# Patient Record
Sex: FEMALE | Race: WHITE | NOT HISPANIC OR LATINO | Employment: OTHER | URBAN - METROPOLITAN AREA
[De-identification: names, ages, dates, MRNs, and addresses within clinical notes are randomized per-mention and may not be internally consistent; named-entity substitution may affect disease eponyms.]

---

## 2017-02-02 ENCOUNTER — TRANSCRIBE ORDERS (OUTPATIENT)
Dept: ADMINISTRATIVE | Facility: HOSPITAL | Age: 72
End: 2017-02-02

## 2017-02-02 DIAGNOSIS — Z12.31 VISIT FOR SCREENING MAMMOGRAM: Primary | ICD-10-CM

## 2017-05-30 ENCOUNTER — HOSPITAL ENCOUNTER (OUTPATIENT)
Dept: MAMMOGRAPHY | Facility: HOSPITAL | Age: 72
Discharge: HOME | End: 2017-05-30
Payer: COMMERCIAL

## 2017-05-30 ENCOUNTER — HOSPITAL ENCOUNTER (OUTPATIENT)
Dept: MAMMOGRAPHY | Facility: MEDICAL CENTER | Age: 72
Discharge: HOME | End: 2017-05-30
Payer: COMMERCIAL

## 2017-05-30 DIAGNOSIS — Z12.31 VISIT FOR SCREENING MAMMOGRAM: ICD-10-CM

## 2017-05-30 PROCEDURE — 77063 BREAST TOMOSYNTHESIS BI: CPT

## 2017-05-30 PROCEDURE — G0202 SCR MAMMO BI INCL CAD: HCPCS

## 2017-09-15 ENCOUNTER — HOSPITAL ENCOUNTER (OUTPATIENT)
Dept: RADIOLOGY | Age: 72
Discharge: HOME | End: 2017-09-15
Payer: COMMERCIAL

## 2017-09-15 ENCOUNTER — TRANSCRIBE ORDERS (OUTPATIENT)
Dept: ADMINISTRATIVE | Age: 72
End: 2017-09-15

## 2017-09-15 ENCOUNTER — TRANSCRIBE ORDERS (OUTPATIENT)
Dept: ADMINISTRATIVE | Facility: HOSPITAL | Age: 72
End: 2017-09-15

## 2017-09-15 ENCOUNTER — APPOINTMENT (OUTPATIENT)
Dept: RADIOLOGY | Age: 72
End: 2017-09-15
Payer: COMMERCIAL

## 2017-09-15 DIAGNOSIS — C64.1 MALIGNANT NEOPLASM OF RIGHT KIDNEY, EXCEPT RENAL PELVIS (HCC): Primary | ICD-10-CM

## 2017-09-15 DIAGNOSIS — C64.1 MALIGNANT NEOPLASM OF RIGHT KIDNEY, EXCEPT RENAL PELVIS (HCC): ICD-10-CM

## 2017-09-15 PROCEDURE — 76700 US EXAM ABDOM COMPLETE: CPT

## 2017-09-15 PROCEDURE — 71020 HB CHEST X-RAY 2VW FRONTAL&LATL: CPT

## 2017-09-21 ENCOUNTER — ALLSCRIPTS OFFICE VISIT (OUTPATIENT)
Dept: OTHER | Facility: OTHER | Age: 72
End: 2017-09-21

## 2017-09-21 LAB
BILIRUB UR QL STRIP: NORMAL
CLARITY UR: NORMAL
COLOR UR: YELLOW
GLUCOSE (HISTORICAL): NORMAL
HGB UR QL STRIP.AUTO: NORMAL
KETONES UR STRIP-MCNC: NORMAL MG/DL
LEUKOCYTE ESTERASE UR QL STRIP: NORMAL
NITRITE UR QL STRIP: NORMAL
PH UR STRIP.AUTO: 7 [PH]
PROT UR STRIP-MCNC: NORMAL MG/DL
SP GR UR STRIP.AUTO: 1.02
UROBILINOGEN UR QL STRIP.AUTO: 1

## 2018-01-11 NOTE — RESULT NOTES
Verified Results  Urine Dip Automated- POC [id number] 10:30AM Anaya Rene     Test Name Result Flag Reference   Color Yellow     Clarity Transparent     Leukocytes neg     Nitrite neg     Blood neg     Bilirubin neg     Urobilinogen 1 0     Protein neg     Ph 7 0     Specific Gravity 1 020     Ketone neg     Glucose neg     Color Yellow     Clarity Transparent     Leukocytes neg     Nitrite neg     Blood neg     Bilirubin neg     Urobilinogen 1 0     Protein neg     Ph 7 0     Specific Gravity 1 020     Ketone neg     Glucose neg               Plan  Malignant neoplasm of kidney, right    · (1) COMPREHENSIVE METABOLIC PANEL; Status:Active; Requested for:[id number];    · * XR CHEST PA & LATERAL; Status:Active;  Requested for:[id number];    · Urine Dip Automated- POC; Status:Complete;   Done: [id number] 10:30AM   · US KIDNEY AND BLADDER; Status:Hold For - Scheduling; Requested for:[id number];

## 2018-01-13 VITALS
DIASTOLIC BLOOD PRESSURE: 68 MMHG | BODY MASS INDEX: 20.38 KG/M2 | SYSTOLIC BLOOD PRESSURE: 114 MMHG | HEIGHT: 63 IN | WEIGHT: 115 LBS

## 2018-09-21 DIAGNOSIS — C64.1 MALIGNANT NEOPLASM OF RIGHT KIDNEY, EXCEPT RENAL PELVIS (HCC): ICD-10-CM

## 2018-09-22 ENCOUNTER — TRANSCRIBE ORDERS (OUTPATIENT)
Dept: ADMINISTRATIVE | Facility: HOSPITAL | Age: 73
End: 2018-09-22

## 2018-09-22 ENCOUNTER — HOSPITAL ENCOUNTER (OUTPATIENT)
Dept: RADIOLOGY | Facility: HOSPITAL | Age: 73
Discharge: HOME | End: 2018-09-22
Attending: UROLOGY
Payer: COMMERCIAL

## 2018-09-22 ENCOUNTER — APPOINTMENT (OUTPATIENT)
Dept: LAB | Facility: HOSPITAL | Age: 73
End: 2018-09-22
Attending: UROLOGY
Payer: COMMERCIAL

## 2018-09-22 ENCOUNTER — HOSPITAL ENCOUNTER (OUTPATIENT)
Dept: ULTRASOUND IMAGING | Facility: HOSPITAL | Age: 73
Discharge: HOME | End: 2018-09-22
Attending: UROLOGY
Payer: COMMERCIAL

## 2018-09-22 DIAGNOSIS — C64.1 MALIGNANT NEOPLASM OF RIGHT KIDNEY, EXCEPT RENAL PELVIS (HCC): ICD-10-CM

## 2018-09-22 DIAGNOSIS — C64.1 MALIGNANT NEOPLASM OF RIGHT KIDNEY, EXCEPT RENAL PELVIS (HCC): Primary | ICD-10-CM

## 2018-09-22 LAB
ALBUMIN SERPL BCP-MCNC: 3.5 G/DL (ref 3.5–5)
ALP SERPL-CCNC: 64 U/L (ref 46–116)
ALT SERPL W P-5'-P-CCNC: 30 U/L (ref 12–78)
ANION GAP SERPL CALCULATED.3IONS-SCNC: 6 MMOL/L (ref 4–13)
AST SERPL W P-5'-P-CCNC: 69 U/L (ref 5–45)
BILIRUB SERPL-MCNC: 0.73 MG/DL (ref 0.2–1)
BUN SERPL-MCNC: 21 MG/DL (ref 5–25)
CALCIUM SERPL-MCNC: 9.3 MG/DL (ref 8.3–10.1)
CHLORIDE SERPL-SCNC: 103 MMOL/L (ref 100–108)
CO2 SERPL-SCNC: 30 MMOL/L (ref 21–32)
CREAT SERPL-MCNC: 1.2 MG/DL (ref 0.6–1.3)
GFR SERPL CREATININE-BSD FRML MDRD: 44 ML/MIN/1.73SQ M
GLUCOSE P FAST SERPL-MCNC: 101 MG/DL (ref 65–99)
POTASSIUM SERPL-SCNC: 3.8 MMOL/L (ref 3.5–5.3)
PROT SERPL-MCNC: 8.6 G/DL (ref 6.4–8.2)
SODIUM SERPL-SCNC: 139 MMOL/L (ref 136–145)

## 2018-09-22 PROCEDURE — 71046 X-RAY EXAM CHEST 2 VIEWS: CPT

## 2018-09-22 PROCEDURE — 76770 US EXAM ABDO BACK WALL COMP: CPT

## 2018-09-22 PROCEDURE — 36415 COLL VENOUS BLD VENIPUNCTURE: CPT

## 2018-09-22 PROCEDURE — 80053 COMPREHEN METABOLIC PANEL: CPT

## 2018-09-25 RX ORDER — ACETAMINOPHEN 325 MG/1
650 TABLET ORAL EVERY 6 HOURS
COMMUNITY

## 2018-09-25 RX ORDER — FLUTICASONE PROPIONATE 50 MCG
2 SPRAY, SUSPENSION (ML) NASAL
COMMUNITY
Start: 2018-04-26 | End: 2019-04-26

## 2018-09-25 RX ORDER — FAMOTIDINE 20 MG/1
20 TABLET, FILM COATED ORAL DAILY
COMMUNITY
Start: 2018-04-26

## 2018-09-25 RX ORDER — CITALOPRAM 20 MG/1
20 TABLET ORAL
COMMUNITY
Start: 2018-08-13

## 2018-09-25 RX ORDER — HYDROCHLOROTHIAZIDE 25 MG/1
TABLET ORAL
COMMUNITY
Start: 2012-03-27

## 2018-09-25 RX ORDER — CIMETIDINE 400 MG/1
TABLET, FILM COATED ORAL
COMMUNITY

## 2018-09-27 ENCOUNTER — OFFICE VISIT (OUTPATIENT)
Dept: UROLOGY | Facility: MEDICAL CENTER | Age: 73
End: 2018-09-27
Payer: COMMERCIAL

## 2018-09-27 VITALS
SYSTOLIC BLOOD PRESSURE: 118 MMHG | DIASTOLIC BLOOD PRESSURE: 74 MMHG | BODY MASS INDEX: 20.61 KG/M2 | HEIGHT: 62 IN | WEIGHT: 112 LBS

## 2018-09-27 DIAGNOSIS — C64.1 MALIGNANT NEOPLASM OF RIGHT KIDNEY, EXCEPT RENAL PELVIS (HCC): Primary | ICD-10-CM

## 2018-09-27 LAB
SL AMB  POCT GLUCOSE, UA: ABNORMAL
SL AMB LEUKOCYTE ESTERASE,UA: ABNORMAL
SL AMB POCT BILIRUBIN,UA: ABNORMAL
SL AMB POCT BLOOD,UA: ABNORMAL
SL AMB POCT CLARITY,UA: CLEAR
SL AMB POCT COLOR,UA: YELLOW
SL AMB POCT KETONES,UA: ABNORMAL
SL AMB POCT NITRITE,UA: ABNORMAL
SL AMB POCT PH,UA: 6.5
SL AMB POCT SPECIFIC GRAVITY,UA: 1.01
SL AMB POCT URINE PROTEIN: ABNORMAL
SL AMB POCT UROBILINOGEN: 0.2

## 2018-09-27 PROCEDURE — 99214 OFFICE O/P EST MOD 30 MIN: CPT | Performed by: UROLOGY

## 2018-09-27 PROCEDURE — 81003 URINALYSIS AUTO W/O SCOPE: CPT | Performed by: UROLOGY

## 2018-09-27 RX ORDER — LEVOTHYROXINE SODIUM 88 UG/1
88 TABLET ORAL EVERY OTHER DAY
COMMUNITY

## 2018-09-27 NOTE — LETTER
2018     Cecil Simon MD  [id number]  [address]    Patient: Wilton Torres   YOB: 1945   Date of Visit: 2018       Dear Dr Candelaria Aranda:    Thank you for referring Bekah Campa to me for evaluation  Below are my notes for this consultation  If you have questions, please do not hesitate to call me  I look forward to following your patient along with you  Sincerely,        Myrtis Shone, MD        CC: No Recipients  Myrtis Shone, MD  2018 11:15 AM  Sign at close encounter  Assessment/Plan:   1  History of right renal cell carcinoma-no evidence of disease 11 years after radical right nephrectomy  No intervention and no follow-up at this time is recommended  Yearly chemistry profiles should be monitored by the PCP and cigarette smoking related COPD changes on chest x-ray followed as well by PCP  Diagnoses and all orders for this visit:    Malignant neoplasm of right kidney, except renal pelvis ([address] )  Comments:  Eleven years since right radical nephrectomy  Orders:  -     POCT urine dip auto non-scope    Other orders  -     acetaminophen (TYLENOL) 325 mg tablet; Take 650 mg by mouth every 6 (six) hours  -     Calcium Carb-Cholecalciferol 600-800 MG-UNIT TABS; Take by mouth  -     Multiple Vitamins-Minerals (CENTRUM SILVER PO); Take by mouth  -     cimetidine (TAGAMET) 400 mg tablet; Take by mouth  -     hydrochlorothiazide (HYDRODIURIL) 25 mg tablet; Take by mouth  -     fluticasone (FLONASE) 50 mcg/act nasal spray; 2 sprays into each nostril  -     Omega-3 Fatty Acids (FISH OIL PO); Take 2 g by mouth  -     famotidine (PEPCID) 20 mg tablet; Take 20 mg by mouth daily  -     citalopram (CeleXA) 20 mg tablet; Take 20 mg by mouth  -     levothyroxine (SYNTHROID) 88 mcg tablet;  Take 88 mcg by mouth every other day  -     Sodium Fluoride (PREVIDENT 5000 BOOSTER PLUS) 1 1 % PSTE; Apply to teeth          Subjective:     Patient ID: Cristina [place] Shailesh Bird is a 74 y o  female  Chief complaint:  History of right renal cell carcinoma    History of present illness:  78-year-old female now 11 years status post right radical nephrectomy for renal cell carcinoma  The patient has had no evidence of disease recurrence and presents for follow-up  The patient I reviewed her chest x-ray which reveals hyperinflated fields which I feel are stable over many years consistent with previous cigarette smoking history and COPD/emphysema  The patient voids well without gross hematuria dysuria incontinence frequency urgency  Chemistry profile shows mild elevation in liver function but no evidence of upper progression or other sites of metastatic disease  Inasmuch as the patient is now 11 years status post right radical nephrectomy with no evidence of disease recurrence she will be discharged back to her PCP  She was advised to discuss her chest x-ray findings with the        Review of Systems   Constitutional: Negative  HENT: Negative  Eyes: Negative  Respiratory: Negative  Cardiovascular: Negative  Gastrointestinal: Negative  Genitourinary: Negative  Musculoskeletal: Negative  Neurological: Negative  Psychiatric/Behavioral: Negative  Objective:     Physical Exam   Constitutional: She is oriented to person, place, and time  She appears well-nourished  No distress  HENT:   Head: Atraumatic  Eyes: EOM are normal    Neck: Neck supple  Pulmonary/Chest: Effort normal  No respiratory distress  Abdominal: Soft  She exhibits no distension  Neurological: She is alert and oriented to person, place, and time  Psychiatric: She has a normal mood and affect  Her behavior is normal  Judgment and thought content normal    Vitals reviewed

## 2018-09-27 NOTE — PROGRESS NOTES
Assessment/Plan:   1  History of right renal cell carcinoma-no evidence of disease 11 years after radical right nephrectomy  No intervention and no follow-up at this time is recommended  Yearly chemistry profiles should be monitored by the PCP and cigarette smoking related COPD changes on chest x-ray followed as well by PCP  Diagnoses and all orders for this visit:    Malignant neoplasm of right kidney, except renal pelvis ([facility] [address] )  Comments:  Eleven years since right radical nephrectomy  Orders:  -     POCT urine dip auto non-scope    Other orders  -     acetaminophen (TYLENOL) 325 mg tablet; Take 650 mg by mouth every 6 (six) hours  -     Calcium Carb-Cholecalciferol 600-800 MG-UNIT TABS; Take by mouth  -     Multiple Vitamins-Minerals (CENTRUM SILVER PO); Take by mouth  -     cimetidine (TAGAMET) 400 mg tablet; Take by mouth  -     hydrochlorothiazide (HYDRODIURIL) 25 mg tablet; Take by mouth  -     fluticasone (FLONASE) 50 mcg/act nasal spray; 2 sprays into each nostril  -     Omega-3 Fatty Acids (FISH OIL PO); Take 2 g by mouth  -     famotidine (PEPCID) 20 mg tablet; Take 20 mg by mouth daily  -     citalopram (CeleXA) 20 mg tablet; Take 20 mg by mouth  -     levothyroxine (SYNTHROID) 88 mcg tablet; Take 88 mcg by mouth every other day  -     Sodium Fluoride (PREVIDENT 5000 BOOSTER PLUS) 1 1 % PSTE; Apply to teeth          Subjective:     Patient ID: Phong Mack is a 74 y o  female  Chief complaint:  History of right renal cell carcinoma    History of present illness:  41-year-old female now 11 years status post right radical nephrectomy for renal cell carcinoma  The patient has had no evidence of disease recurrence and presents for follow-up  The patient I reviewed her chest x-ray which reveals hyperinflated fields which I feel are stable over many years consistent with previous cigarette smoking history and COPD/emphysema    The patient voids well without gross hematuria dysuria incontinence frequency urgency  Chemistry profile shows mild elevation in liver function but no evidence of upper progression or other sites of metastatic disease  Inasmuch as the patient is now 11 years status post right radical nephrectomy with no evidence of disease recurrence she will be discharged back to her PCP  She was advised to discuss her chest x-ray findings with the        Review of Systems   Constitutional: Negative  HENT: Negative  Eyes: Negative  Respiratory: Negative  Cardiovascular: Negative  Gastrointestinal: Negative  Genitourinary: Negative  Musculoskeletal: Negative  Neurological: Negative  Psychiatric/Behavioral: Negative  Objective:     Physical Exam   Constitutional: She is oriented to person, place, and time  She appears well-nourished  No distress  HENT:   Head: Atraumatic  Eyes: EOM are normal    Neck: Neck supple  Pulmonary/Chest: Effort normal  No respiratory distress  Abdominal: Soft  She exhibits no distension  Neurological: She is alert and oriented to person, place, and time  Psychiatric: She has a normal mood and affect  Her behavior is normal  Judgment and thought content normal    Vitals reviewed